# Patient Record
Sex: FEMALE | Race: BLACK OR AFRICAN AMERICAN | Employment: FULL TIME | URBAN - METROPOLITAN AREA
[De-identification: names, ages, dates, MRNs, and addresses within clinical notes are randomized per-mention and may not be internally consistent; named-entity substitution may affect disease eponyms.]

---

## 2023-06-17 ENCOUNTER — HOSPITAL ENCOUNTER (EMERGENCY)
Facility: HOSPITAL | Age: 18
Discharge: HOME | End: 2023-06-17
Attending: EMERGENCY MEDICINE
Payer: OTHER GOVERNMENT

## 2023-06-17 VITALS
RESPIRATION RATE: 18 BRPM | BODY MASS INDEX: 25.76 KG/M2 | DIASTOLIC BLOOD PRESSURE: 75 MMHG | OXYGEN SATURATION: 97 % | HEART RATE: 97 BPM | SYSTOLIC BLOOD PRESSURE: 128 MMHG | WEIGHT: 140 LBS | HEIGHT: 62 IN | TEMPERATURE: 100 F

## 2023-06-17 DIAGNOSIS — J02.9 ACUTE PHARYNGITIS, UNSPECIFIED ETIOLOGY: Primary | ICD-10-CM

## 2023-06-17 LAB — DEPRECATED S PYO AG THROAT QL EIA: NEGATIVE

## 2023-06-17 PROCEDURE — 87880 STREP A ASSAY W/OPTIC: CPT

## 2023-06-17 PROCEDURE — 99283 EMERGENCY DEPT VISIT LOW MDM: CPT

## 2023-06-17 PROCEDURE — 87070 CULTURE OTHR SPECIMN AEROBIC: CPT

## 2023-06-17 PROCEDURE — 6370000000 HC RX 637 (ALT 250 FOR IP): Performed by: EMERGENCY MEDICINE

## 2023-06-17 PROCEDURE — 87147 CULTURE TYPE IMMUNOLOGIC: CPT

## 2023-06-17 RX ORDER — IBUPROFEN 600 MG/1
600 TABLET ORAL
Status: COMPLETED | OUTPATIENT
Start: 2023-06-17 | End: 2023-06-17

## 2023-06-17 RX ADMIN — IBUPROFEN 600 MG: 600 TABLET, FILM COATED ORAL at 07:29

## 2023-06-17 ASSESSMENT — PAIN - FUNCTIONAL ASSESSMENT: PAIN_FUNCTIONAL_ASSESSMENT: 0-10

## 2023-06-17 ASSESSMENT — PAIN SCALES - GENERAL: PAINLEVEL_OUTOF10: 7

## 2023-06-17 ASSESSMENT — PAIN DESCRIPTION - LOCATION: LOCATION: THROAT

## 2023-06-17 ASSESSMENT — PAIN DESCRIPTION - DESCRIPTORS: DESCRIPTORS: SORE

## 2023-06-17 ASSESSMENT — PAIN DESCRIPTION - ORIENTATION: ORIENTATION: INNER

## 2023-06-17 NOTE — ED PROVIDER NOTES
[facility] EMERGENCY DEPT  EMERGENCY DEPARTMENT ENCOUNTER       Pt Name: Fabien Pleitez  MRN: [id number]  [place] 2005  Date of evaluation: 6/17/2023  Provider: Leonel Sena MD   PCP: Anthony Humphrey MD  Note Started: 7:45 AM 6/17/23     CHIEF COMPLAINT       Chief Complaint   Patient presents with    Pharyngitis    Fever        HISTORY OF PRESENT ILLNESS: 1 or more elements      History From: Patient, History limited by: None     Fabien Pleitez is a 19 y.o. female who presents with sore throat and fever. She reports over the past 4 to 5 days she has had persistent sore throat, fever up to 102 yesterday. Mild cough. No runny nose. She is been taking DayQuil and Motrin with transient relief. Multiple home COVID test which were negative. Nursing Notes were all reviewed and agreed with or any disagreements were addressed in the HPI. REVIEW OF SYSTEMS        Positives and Pertinent negatives as per HPI. PAST HISTORY     Past Medical History:  No past medical history on file. Past Surgical History:  No past surgical history on file. Family History:  No family history on file. Social History: Allergies:  No Known Allergies    CURRENT MEDICATIONS      Previous Medications    No medications on file       SCREENINGS               No data recorded         PHYSICAL EXAM      Vitals:    06/17/23 0659 06/17/23 0701 06/17/23 0713   BP: 128/75     Pulse: (!) 110  97   Resp: 18     Temp:  100 °F (37.8 °C)    TempSrc: Oral Oral    SpO2: 98%  97%   Weight: 63.5 kg (140 lb)     Height: 1.575 m (5' 2\")          Physical Exam  Vitals and nursing note reviewed. Constitutional:       General: She is not in acute distress. Appearance: She is well-developed. She is not ill-appearing. HENT:      Right Ear: Tympanic membrane and ear canal normal.      Left Ear: Tympanic membrane and ear canal normal.      Mouth/Throat:      Mouth: No oral lesions.       Pharynx: Oropharyngeal exudate and posterior

## 2023-06-17 NOTE — DISCHARGE INSTRUCTIONS
Continue ibuprofen 600 mg every 6 hours, Tylenol 1 g every 6 hours. Warm tea with honey is also proven to be effective for sore throat. This is likely a virus but may be a bacterial pharyngitis. If you are throat culture is positive we will call you and prescribe an antibiotic at that time.

## 2023-06-17 NOTE — ED TRIAGE NOTES
Fever and sore throat x 1 week. Notes fever has been intermittent. Covid tests taken w/ negative result.  Motrin taken 6 hrs ago and cough syrup taken PTA

## 2023-06-18 RX ORDER — AMOXICILLIN 875 MG/1
875 TABLET, COATED ORAL 2 TIMES DAILY
Qty: 20 TABLET | Refills: 0 | Status: SHIPPED | OUTPATIENT
Start: 2023-06-18 | End: 2023-06-28

## 2023-06-19 LAB
BACTERIA SPEC CULT: ABNORMAL
BACTERIA SPEC CULT: ABNORMAL
SERVICE CMNT-IMP: ABNORMAL